# Patient Record
Sex: MALE | Race: WHITE
[De-identification: names, ages, dates, MRNs, and addresses within clinical notes are randomized per-mention and may not be internally consistent; named-entity substitution may affect disease eponyms.]

---

## 2020-02-19 ENCOUNTER — HOSPITAL ENCOUNTER (EMERGENCY)
Dept: HOSPITAL 8 - ED | Age: 75
Discharge: HOME | End: 2020-02-19
Payer: MEDICARE

## 2020-02-19 VITALS — SYSTOLIC BLOOD PRESSURE: 190 MMHG | DIASTOLIC BLOOD PRESSURE: 114 MMHG

## 2020-02-19 VITALS — BODY MASS INDEX: 23.73 KG/M2 | HEIGHT: 71 IN | WEIGHT: 169.54 LBS

## 2020-02-19 DIAGNOSIS — B02.9: Primary | ICD-10-CM

## 2020-02-19 DIAGNOSIS — R07.89: ICD-10-CM

## 2020-02-19 DIAGNOSIS — R21: ICD-10-CM

## 2020-02-19 DIAGNOSIS — I10: ICD-10-CM

## 2020-02-19 PROCEDURE — 99283 EMERGENCY DEPT VISIT LOW MDM: CPT

## 2020-02-19 PROCEDURE — 93005 ELECTROCARDIOGRAM TRACING: CPT

## 2020-02-19 NOTE — NUR
PT WITH CHEST DISCOMFOR IN R CHEST ARM AREA, STATES PAIN HAS COME AND GONE FOR 
THE LAST WEEK. RASH NOTED TO CENTER OF UPPPER STOMACH, PRESENT FOR 5 DAYS PER 
PT REPORT 



PT TO ALL MONITORING EQUIPMENT, ERMD IN TO EVAL PT